# Patient Record
Sex: FEMALE | Race: WHITE | Employment: UNEMPLOYED | URBAN - METROPOLITAN AREA
[De-identification: names, ages, dates, MRNs, and addresses within clinical notes are randomized per-mention and may not be internally consistent; named-entity substitution may affect disease eponyms.]

---

## 2018-11-13 ENCOUNTER — OFFICE VISIT (OUTPATIENT)
Dept: ORTHOPEDIC SURGERY | Age: 33
End: 2018-11-13
Payer: COMMERCIAL

## 2018-11-13 ENCOUNTER — TELEPHONE (OUTPATIENT)
Dept: ORTHOPEDIC SURGERY | Age: 33
End: 2018-11-13

## 2018-11-13 VITALS
HEART RATE: 89 BPM | WEIGHT: 177 LBS | HEIGHT: 64 IN | DIASTOLIC BLOOD PRESSURE: 105 MMHG | SYSTOLIC BLOOD PRESSURE: 162 MMHG | BODY MASS INDEX: 30.22 KG/M2

## 2018-11-13 DIAGNOSIS — M25.561 RIGHT KNEE PAIN, UNSPECIFIED CHRONICITY: Primary | ICD-10-CM

## 2018-11-13 DIAGNOSIS — M23.91 UNSPECIFIED INTERNAL DERANGEMENT OF RIGHT KNEE: ICD-10-CM

## 2018-11-13 PROCEDURE — G8427 DOCREV CUR MEDS BY ELIG CLIN: HCPCS | Performed by: ORTHOPAEDIC SURGERY

## 2018-11-13 PROCEDURE — 99203 OFFICE O/P NEW LOW 30 MIN: CPT | Performed by: ORTHOPAEDIC SURGERY

## 2018-11-13 PROCEDURE — G8419 CALC BMI OUT NRM PARAM NOF/U: HCPCS | Performed by: ORTHOPAEDIC SURGERY

## 2018-11-13 PROCEDURE — G8484 FLU IMMUNIZE NO ADMIN: HCPCS | Performed by: ORTHOPAEDIC SURGERY

## 2018-11-13 PROCEDURE — 4004F PT TOBACCO SCREEN RCVD TLK: CPT | Performed by: ORTHOPAEDIC SURGERY

## 2018-11-13 RX ORDER — IBUPROFEN 200 MG
200 TABLET ORAL EVERY 6 HOURS PRN
COMMUNITY

## 2018-11-13 NOTE — PROGRESS NOTES
NEW PATIENT ORTHOPAEDIC NOTE    Chief Complaint   Patient presents with    Knee Pain     Right knee pain x1.5 weeks. No known injury. HPI  26 y.o. female seen for evaluation of right knee pain:    Onset 1.5 weeks ago  History of symptoms denies  Injury/trauma none  Pain is located diffuse, worse posterior knee, medial  Worse with full extension, WB  Better with laying still  Associated with swelling in knee    Smokes 1/2 ppd    I have reviewed and discussed thebelow pain assessment findings with the patient. Pain Assessment  Location of Pain: Knee  Location Modifiers: Right  Severity of Pain: 5  Quality of Pain: Sharp, Throbbing  Duration of Pain: Persistent  Frequency of Pain: Constant  Date Pain First Started:  (x1.5 wk)  Aggravating Factors: Walking  Limiting Behavior: Yes  Relieving Factors: Rest  Result of Injury: No  Work-Related Injury: No  Are there other pain locations you wish to document?: No    Review of Systems  Constitutional- denies fevers, weight loss  Cardiovascular - denies chest pain, palpitations, peripheral edema, blood clots, + HTN  Respiratory - denies SOB, cough,  + asthma  Gastrointestinal - denies abdominal pain, nausea, vomiting  Genitourinary - denies dysuria, discharge  Musculoskeletal - per HPI  Integumentary - denies rash, sores  Neurologic - denies numbness,tingling, paresthesias  Hematologic - denies abnormal bleeding, blood clots  Allergic/Immunologic - denies metal allergies, recurrent infections    Allergies   Allergen Reactions    Bactrim [Sulfamethoxazole-Trimethoprim]     Macrobid [Nitrofurantoin Monohydrate Macrocrystals] Hives    Penicillins Hives    Sulfamethoxazole-Trimethoprim Hives        Current Outpatient Prescriptions   Medication Sig Dispense Refill    ibuprofen (ADVIL;MOTRIN) 200 MG tablet Take 200 mg by mouth every 6 hours as needed for Pain      labetalol (NORMODYNE) 200 MG tablet Take 1 tablet by mouth three times daily.  60 tablet 2    cetirizine (ZYRTEC) 10 MG chewable tablet Take 1 tablet by mouth daily. 30 tablet 1    gabapentin (NEURONTIN) 300 MG capsule TAKE 1 CAPSULE THREE TIMES DAILY 90 capsule 2    risperiDONE (RISPERDAL) 0.5 MG tablet Take 1 tablet in the a.m. And 2 tablets @ [facility] 90 tablet 2    albuterol (PROAIR HFA) 108 (90 BASE) MCG/ACT inhaler Inhale 2 puffs into the lungs every 6 hours as needed for Wheezing. 1 Inhaler 3     No current facility-administered medications for this visit.         Past Medical History:   Diagnosis Date    Abnormal weight gain 06/26/09    Acute bronchitis 09/15/10    Acute pharyngitis 10/25/06    Acute sinusitis 10/31/08    Asthma 12/04/06    Back pain 01/30/07    Bipolar I disorder ([facility] [address] 04/11/06    Cervical radiculopathy 10/04/2010    Decreased muscle tone 05/27/08    Depression with anxiety 10/18/05    Herpes simplex 01/11/06    Insomnia 05/04/2012    Lumbar disc disease     Mood swings 0221/2012    Otitis media 04/21/08    Pain disorder 07/24/09    Pain in knee 12/04/06    Rectal bleeding 07/13/2011    Tobacco abuse 10/31/08    Upper respiratory infection, acute 12/31/08        Past Surgical History:   Procedure Laterality Date    WRIST SURGERY Left 4/9/15    1st Dorsal Compartment ([facility]) Release       Family History   Problem Relation Age of Onset    Asthma Mother     Diabetes Father     High Blood Pressure Father     High Cholesterol Father     Vision Loss Father     [de-identified] / [place] Sister        Social History     Social History    Marital status:      Spouse name: N/A    Number of children: N/A    Years of education: N/A     Occupational History   [employer]     Social History Main Topics    Smoking status: Current Every Day Smoker     Packs/day: 0.50     Years: 18.00     Types: Cigarettes    Smokeless tobacco: Never Used      Comment: under a great deal of stress    Alcohol use No    Drug use: No    Sexual activity: Yes

## 2018-11-16 ENCOUNTER — TELEPHONE (OUTPATIENT)
Dept: ORTHOPEDIC SURGERY | Age: 33
End: 2018-11-16

## 2018-11-16 DIAGNOSIS — M23.91 UNSPECIFIED INTERNAL DERANGEMENT OF RIGHT KNEE: Primary | ICD-10-CM

## 2018-11-20 RX ORDER — DIAZEPAM 5 MG/1
TABLET ORAL
Qty: 1 TABLET | Refills: 0 | Status: SHIPPED | OUTPATIENT
Start: 2018-11-24 | End: 2018-11-26

## 2018-12-05 ENCOUNTER — TELEPHONE (OUTPATIENT)
Dept: ORTHOPEDIC SURGERY | Age: 33
End: 2018-12-05